# Patient Record
(demographics unavailable — no encounter records)

---

## 2025-05-21 NOTE — HISTORY OF PRESENT ILLNESS
[FreeTextEntry1] : totally non compliant patient, not seen for 2 years, now has some acid reflux symptoms.

## 2025-05-21 NOTE — ASSESSMENT
[FreeTextEntry1] : unable to tolerate higher dosage of Ozempic, continue current treatment plan, needs labs, not seen for 2 years.